# Patient Record
Sex: MALE | Race: WHITE | NOT HISPANIC OR LATINO | URBAN - METROPOLITAN AREA
[De-identification: names, ages, dates, MRNs, and addresses within clinical notes are randomized per-mention and may not be internally consistent; named-entity substitution may affect disease eponyms.]

---

## 2018-09-06 ENCOUNTER — IMPORTED ENCOUNTER (OUTPATIENT)
Dept: URBAN - METROPOLITAN AREA CLINIC 1 | Facility: CLINIC | Age: 58
End: 2018-09-06

## 2018-09-06 PROBLEM — H52.4: Noted: 2018-09-06

## 2018-09-06 PROBLEM — H52.03: Noted: 2018-09-06

## 2018-09-06 PROCEDURE — 92015 DETERMINE REFRACTIVE STATE: CPT

## 2018-09-06 PROCEDURE — 92014 COMPRE OPH EXAM EST PT 1/>: CPT

## 2018-09-06 NOTE — PATIENT DISCUSSION
1.  Hyperopia OU -- Finalized Glasses MRx was given to patient today for corrective spectacles if indicated2. Presbyopia OU 3. Cataracts OU -- Observe 4. Dry Eyes OU -- Recommend the frequent use of OTC AT's BID-QID OU5. Glaucoma Suspect OU (CD: 0.80 OU) -- IOP stable today at 16 OU. Positive family h/o Glaucoma. Past w/u negative. Patient is considered high risk. Condition was discussed with patient and patient understands. Will continue to monitor patient for any progression in condition. Patient was advised to call us with any problems questions or concerns6. Blepharitis OU -- Recommend Warm / Hot Moist Compresses Lid Scrubs and Baby Shampoo QHS / PRN OUReturn for an appointment in 6 MO for a 27 / OCT OU with Dr. Chichi Dinh.

## 2019-09-23 ENCOUNTER — IMPORTED ENCOUNTER (OUTPATIENT)
Dept: URBAN - METROPOLITAN AREA CLINIC 1 | Facility: CLINIC | Age: 59
End: 2019-09-23

## 2019-09-23 PROBLEM — H40.023: Noted: 2019-09-23

## 2019-09-23 PROBLEM — H40.013: Noted: 2019-09-23

## 2019-09-23 PROBLEM — H25.813: Noted: 2019-09-23

## 2019-09-23 PROCEDURE — 92014 COMPRE OPH EXAM EST PT 1/>: CPT

## 2019-09-23 PROCEDURE — 92133 CPTRZD OPH DX IMG PST SGM ON: CPT

## 2019-09-23 NOTE — PATIENT DISCUSSION
1.  Glaucoma Suspect OU :(.8 OU) OCt shows minimal thinning OS Temp OD WNL. (FHX) Patient is considered high risk. Condition was discussed with patient and patient understands. Will continue to monitor patient for any progression in condition. Patient was advised to call us with any problems questions or concerns. 2.  Blepharitis posterior type OU - Daily warm compresses and lid scrubs were recommended. 3. Cataract OU: Observe for now without intervention. The patient was advised to contact us if any change or worsening of visionReturn for an appointment in 1 year 36 with Dr. Doe Brown.

## 2020-11-09 ENCOUNTER — IMPORTED ENCOUNTER (OUTPATIENT)
Dept: URBAN - NONMETROPOLITAN AREA CLINIC 1 | Facility: CLINIC | Age: 60
End: 2020-11-09

## 2020-11-09 PROBLEM — H52.4: Noted: 2020-11-09

## 2020-11-09 PROBLEM — H25.13: Noted: 2020-11-09

## 2020-11-09 PROBLEM — H40.013: Noted: 2020-11-09

## 2020-11-09 PROCEDURE — S0620 ROUTINE OPHTHALMOLOGICAL EXA: HCPCS

## 2020-11-09 NOTE — PATIENT DISCUSSION
Presbyopia OUDiscussed refractive status in detail with patient. New glasses Rx given today. Continue to monitor. [place] OUDiscussed diagnosis with patient. Reviewed symptoms related to cataract progression. Discussed various treatment options with patient. No treatment required at this time. Continue to monitor. Glaucoma Suspect OUDiscussed diagnosis in detail with patient. Discussed the chronic progressive nature of this disease and various treatment options. IOP at 13 OU. Cup to disc noted at 0.75 OU. Continue to monitor. RTC in 6 months with VF 24-2 OCT and Pach; 's Notes: MR  11/9/20DFE OCTVFOPTOSGONIO [facility]

## 2022-04-02 ASSESSMENT — KERATOMETRY
OS_AXISANGLE_DEGREES: 033
OS_AXISANGLE2_DEGREES: 123
OD_K1POWER_DIOPTERS: 42.25
OD_K2POWER_DIOPTERS: 43.50
OD_AXISANGLE2_DEGREES: 077
OS_K1POWER_DIOPTERS: 42.50
OS_K2POWER_DIOPTERS: 42.50
OD_AXISANGLE_DEGREES: 167

## 2022-04-02 ASSESSMENT — VISUAL ACUITY
OS_GLARE: 20/50
OD_SC: 20/20
OD_SC: 20/20
OS_SC: 20/20
OD_CC: J1
OD_GLARE: 20/50
OS_SC: 20/20
OS_CC: J1

## 2022-04-02 ASSESSMENT — TONOMETRY
OD_IOP_MMHG: 16
OS_IOP_MMHG: 16
OD_IOP_MMHG: 16
OS_IOP_MMHG: 16

## 2022-04-09 ASSESSMENT — TONOMETRY
OS_IOP_MMHG: 13
OD_IOP_MMHG: 13

## 2022-04-09 ASSESSMENT — VISUAL ACUITY
OD_SC: 20/20
OS_SC: 20/20

## 2024-04-08 ENCOUNTER — FOLLOW UP (OUTPATIENT)
Dept: URBAN - NONMETROPOLITAN AREA CLINIC 1 | Facility: CLINIC | Age: 64
End: 2024-04-08

## 2024-04-08 DIAGNOSIS — H40.1134: ICD-10-CM

## 2024-04-08 DIAGNOSIS — H25.13: ICD-10-CM

## 2024-04-08 PROCEDURE — 99214 OFFICE O/P EST MOD 30 MIN: CPT

## 2024-04-08 PROCEDURE — 92133 CPTRZD OPH DX IMG PST SGM ON: CPT

## 2024-04-08 ASSESSMENT — VISUAL ACUITY
OS_CC: 20/20-1
OU_CC: 20/20-1
OD_CC: 20/20

## 2024-04-08 ASSESSMENT — TONOMETRY
OD_IOP_MMHG: 18
OS_IOP_MMHG: 18

## 2024-06-27 ENCOUNTER — FOLLOW UP (OUTPATIENT)
Dept: URBAN - NONMETROPOLITAN AREA CLINIC 1 | Facility: CLINIC | Age: 64
End: 2024-06-27

## 2024-06-27 DIAGNOSIS — H40.1131: ICD-10-CM

## 2024-06-27 PROCEDURE — 92083 EXTENDED VISUAL FIELD XM: CPT

## 2024-06-27 PROCEDURE — 99214 OFFICE O/P EST MOD 30 MIN: CPT

## 2024-06-27 ASSESSMENT — TONOMETRY
OS_IOP_MMHG: 15
OD_IOP_MMHG: 15

## 2024-06-27 ASSESSMENT — VISUAL ACUITY
OD_CC: 20/20-1
OU_CC: 20/20
OS_CC: 20/20

## 2024-11-15 ENCOUNTER — CONTACT LENSES/GLASSES VISIT (OUTPATIENT)
Dept: URBAN - NONMETROPOLITAN AREA CLINIC 1 | Facility: CLINIC | Age: 64
End: 2024-11-15

## 2024-11-15 DIAGNOSIS — H52.4: ICD-10-CM

## 2024-11-15 PROCEDURE — 92015 DETERMINE REFRACTIVE STATE: CPT

## 2025-01-09 ENCOUNTER — FOLLOW UP (OUTPATIENT)
Age: 65
End: 2025-01-09

## 2025-01-09 DIAGNOSIS — H40.1131: ICD-10-CM

## 2025-01-09 DIAGNOSIS — H25.13: ICD-10-CM

## 2025-01-09 PROCEDURE — 99214 OFFICE O/P EST MOD 30 MIN: CPT

## 2025-01-09 PROCEDURE — 92250 FUNDUS PHOTOGRAPHY W/I&R: CPT

## 2025-07-11 ENCOUNTER — FOLLOW UP (OUTPATIENT)
Age: 65
End: 2025-07-11

## 2025-07-11 DIAGNOSIS — H40.1131: ICD-10-CM

## 2025-07-11 DIAGNOSIS — H25.13: ICD-10-CM

## 2025-07-11 PROCEDURE — 92133 CPTRZD OPH DX IMG PST SGM ON: CPT

## 2025-07-11 PROCEDURE — 99214 OFFICE O/P EST MOD 30 MIN: CPT
